# Patient Record
(demographics unavailable — no encounter records)

---

## 2024-10-28 NOTE — ASSESSMENT
[FreeTextEntry1] : Patient is a 63-year-old female with a past medical history as above who presents for Rx refills and general follow-up.

## 2024-10-28 NOTE — PHYSICAL EXAM
[No Acute Distress] : no acute distress [Well Nourished] : well nourished [Well Developed] : well developed [Well-Appearing] : well-appearing [Normal Voice/Communication] : normal voice/communication [Normal Sclera/Conjunctiva] : normal sclera/conjunctiva [PERRL] : pupils equal round and reactive to light [EOMI] : extraocular movements intact [Normal Outer Ear/Nose] : the outer ears and nose were normal in appearance [Normal Oropharynx] : the oropharynx was normal [Normal TMs] : both tympanic membranes were normal [Normal Nasal Mucosa] : the nasal mucosa was normal [No JVD] : no jugular venous distention [No Lymphadenopathy] : no lymphadenopathy [Supple] : supple [Thyroid Normal, No Nodules] : the thyroid was normal and there were no nodules present [No Respiratory Distress] : no respiratory distress  [No Accessory Muscle Use] : no accessory muscle use [Clear to Auscultation] : lungs were clear to auscultation bilaterally [Normal Rate] : normal rate  [Regular Rhythm] : with a regular rhythm [Normal S1, S2] : normal S1 and S2 [No Carotid Bruits] : no carotid bruits [No Abdominal Bruit] : a ~M bruit was not heard ~T in the abdomen [No Varicosities] : no varicosities [Pedal Pulses Present] : the pedal pulses are present [No Edema] : there was no peripheral edema [No Palpable Aorta] : no palpable aorta [Soft] : abdomen soft [Non Tender] : non-tender [Non-distended] : non-distended [No Masses] : no abdominal mass palpated [No HSM] : no HSM [Normal Bowel Sounds] : normal bowel sounds [Normal Supraclavicular Nodes] : no supraclavicular lymphadenopathy [Normal Posterior Cervical Nodes] : no posterior cervical lymphadenopathy [Normal Anterior Cervical Nodes] : no anterior cervical lymphadenopathy [No CVA Tenderness] : no CVA  tenderness [No Spinal Tenderness] : no spinal tenderness [No Joint Swelling] : no joint swelling [Grossly Normal Strength/Tone] : grossly normal strength/tone [No Rash] : no rash [Coordination Grossly Intact] : coordination grossly intact [No Focal Deficits] : no focal deficits [Normal Gait] : normal gait [Deep Tendon Reflexes (DTR)] : deep tendon reflexes were 2+ and symmetric [Speech Grossly Normal] : speech grossly normal [Memory Grossly Normal] : memory grossly normal [Normal Affect] : the affect was normal [Alert and Oriented x3] : oriented to person, place, and time [Normal Mood] : the mood was normal [Normal Insight/Judgement] : insight and judgment were intact [Normal] : affect was normal and insight and judgment were intact [Kyphosis] : no kyphosis [Scoliosis] : no scoliosis [Acne] : no acne

## 2024-10-28 NOTE — PLAN
[FreeTextEntry1] : Psychiatry anxiety - continue Alprazolam 0.5mg TID p.o. p.r.n. as directed, Rx filled,  reviewed; Reference Number:[id number] - continue with CV exercise/weight lifting - continue to follow up with therapist, history of depression - previously recommended continuing exercise; continue to follow up with therapist, Cardiology history of elevated LDL/hyperlipidemia - continue low cholesterol/low fat diet, Endocrinology hyperglycemia - continue low carbohydrate/low sugar diet and exercise hypothyroidism - continue Synthroid (Levothyroxine Sodium) 125mcg p.o.q.d, RX refilled history of low Vitamin D level - temporarily holding vitamin D supplementation for summer history of Vitamin B12 deficiency - holding Vitamin B-12 1000mcg p.o.q.d. in light of high B 12 level on recent blood work check fasting blood work

## 2024-10-28 NOTE — HISTORY OF PRESENT ILLNESS
[FreeTextEntry1] : RX refill and general follow-up  [de-identified] : Patient is a 63-year-old female with a past medical history as below who presents for RX refills and general follow-up. Patient states she is taking all medications as prescribed and denies any adverse reactions or side effects. She denies heat or cold intolerance, fatigue or significant weight change on Synthroid (Levothyroxine Sodium) which she is taking for hypothyroidism.  Patient requests Rx refill for Alprazolam which she takes as needed for anxiety. She states the medication is helping to intermittently control/minimize her symptoms of anxiety and panic. She continues to see her therapist and exercise regularly which has helped her to control her anxiety and panic disorder.  Her most recent blood work from March 2024 demonstrated A1C of 5.4% , , , vitamin B 12 1471.  She is trying to minimize her red meat intake as well as other foods rich in cholesterol.  She has stopped both vitamin D and vitamin B 12 secondary to high serum B 12 level and her increased time spent outdoors in summer..  Her TFTs were normal and she requests RX refill on levothyroxine.

## 2024-12-30 NOTE — PLAN
[FreeTextEntry1] : Psychiatry anxiety - continue Alprazolam 0.5mg TID p.o. p.r.n. as directed, Rx filled,  reviewed - continue with CV exercise/weight lifting - continue to follow up with therapist, history of depression - previously recommended continuing exercise; continue to follow up with therapist, Cardiology history of elevated LDL/hyperlipidemia - continue low cholesterol/low fat diet, Endocrinology hyperglycemia - continue low carbohydrate/low sugar diet and exercise hypothyroidism - continue Synthroid (Levothyroxine Sodium) 125mcg p.o.q.d,  history of low Vitamin D level - c/w supplementation history of Vitamin B12 deficiency - holding Vitamin B-12 1000mcg p.o.q.d. in light of high B 12 level on recent blood work

## 2024-12-30 NOTE — END OF VISIT
[FreeTextEntry3] : I, Devorah Nicholas, acted solely as scribe for Dr. Joaquin Morgan DO on this date 12/30/2024.  All medical record entries made by the Scribe were at my, Dr. Joaquin Morgan DO direction and personally dictated by me on 12/30/2024, I have reviewed the chart and agree that the record accurately reflects my personal performance of the history, physical exam, assessment and plan. I have also personally directed, reviewed and agreed with the chart.   [Time Spent: ___ minutes] : I have spent [unfilled] minutes of time on the encounter which excludes teaching and separately reported services.

## 2024-12-30 NOTE — HISTORY OF PRESENT ILLNESS
[FreeTextEntry1] : RX refill and general follow-up  [de-identified] : EMRE GOLDBERG is a 64-year-old female with a past medical history as below who presents for RX refill and general follow-up. Patient states she is taking all medications as prescribed and denies any adverse reactions or side effects. She denies heat or cold intolerance, fatigue or significant weight change on Synthroid (Levothyroxine Sodium) which she is taking for hypothyroidism.  Patient requests Rx refill for Alprazolam which she takes as needed for anxiety. She states the medication is helping to intermittently control/minimize her symptoms of anxiety and panic. She continues to see her therapist and exercise regularly which has helped her to control her anxiety and panic disorder. She notes having more anxiety around the holidays.  Her most recent blood work from October 2024 demonstrated A1C of 5.4% , , , vitamin B 12 764pg/mL.  She is trying to minimize her red meat intake as well as other foods rich in cholesterol.  She has stopped both vitamin D and vitamin B 12 secondary to high serum B 12 level and her increased time spent outdoors..  Her TFTs were normal.

## 2024-12-30 NOTE — ASSESSMENT
[FreeTextEntry1] : EMRE GOLDBERG is a 64-year-old female who presents for RX refills and general follow-up.

## 2025-02-18 NOTE — HISTORY OF PRESENT ILLNESS
[FreeTextEntry1] : Rx refill  general follow up [de-identified] : EMRE GOLDBERG is a 64-year-old female with a past medical history as below who presents for RX refill and general follow-up. Patient states she is taking all medications as prescribed and denies any adverse reactions or side effects. She denies heat or cold intolerance, fatigue or significant weight change on Synthroid (Levothyroxine Sodium) which she is taking for hypothyroidism.  Patient requests Rx refill for Alprazolam which she takes as needed for anxiety. She states the medication is helping to intermittently control/minimize her symptoms of anxiety and panic. She continues to see her therapist and exercise regularly which has helped her to control her anxiety and panic disorder. She notes having more anxiety around the holidays.  Her most recent blood work from October 2024 demonstrated A1C of 5.4% , , , vitamin B 12 764pg/mL.  She is trying to minimize her red meat intake as well as other foods rich in cholesterol.  She has stopped both vitamin D and vitamin B 12 secondary to high serum B 12 level and her increased time spent outdoors..  Her TFTs were normal.

## 2025-02-18 NOTE — REVIEW OF SYSTEMS
[FreeTextEntry1] : Mr. Perez is a 57yo man being seen for follow up of his stroke and posterior circulation stenosis.  His last CTA H+N was last year and showed stable mild basilar artery stenosis and pca stenosis.  He has had no symptoms since last I spoke with him on May 18 2020.\par Is compliant with Brilinta and aspirin\joesph Says cholesterol is good\par Checks his BP and says is normal\par  [Anxiety] : anxiety [Negative] : Heme/Lymph

## 2025-02-18 NOTE — HISTORY OF PRESENT ILLNESS
[FreeTextEntry1] : Rx refill  general follow up [de-identified] : EMRE GOLDBERG is a 64-year-old female with a past medical history as below who presents for RX refill and general follow-up. Patient states she is taking all medications as prescribed and denies any adverse reactions or side effects. She denies heat or cold intolerance, fatigue or significant weight change on Synthroid (Levothyroxine Sodium) which she is taking for hypothyroidism.  Patient requests Rx refill for Alprazolam which she takes as needed for anxiety. She states the medication is helping to intermittently control/minimize her symptoms of anxiety and panic. She continues to see her therapist and exercise regularly which has helped her to control her anxiety and panic disorder. She notes having more anxiety around the holidays.  Her most recent blood work from October 2024 demonstrated A1C of 5.4% , , , vitamin B 12 764pg/mL.  She is trying to minimize her red meat intake as well as other foods rich in cholesterol.  She has stopped both vitamin D and vitamin B 12 secondary to high serum B 12 level and her increased time spent outdoors..  Her TFTs were normal.

## 2025-02-18 NOTE — END OF VISIT
[FreeTextEntry3] : I, Devorah Nicholas, acted solely as scribe for Dr. Joaquin Morgan DO on this date 02/18/2025.   All medical record entries made by the Scribe were at my, Dr. Joaquin Morgan DO direction and personally dictated by me on 02/18/2025, I have reviewed the chart and agree that the record accurately reflects my personal performance of the history, physical exam, assessment and plan. I have also personally directed, reviewed and agreed with the chart.     [Time Spent: ___ minutes] : I have spent [unfilled] minutes of time on the encounter which excludes teaching and separately reported services.

## 2025-03-20 NOTE — HISTORY OF PRESENT ILLNESS
[FreeTextEntry1] : GYN/Ref: Jakub Yang MD PCP: Dr. Rony Morgan Cards: Dr. Carey GI: Dr. Orville Pearce  Ms. Diaz is a 63 y.o. woman here for a follow up evaluation for a persistently abnormal low-grade pap after TLH BS for ASC-H pap in October of 2022 with benign pathology in the hysterectomy specimen. Post operatively she developed vaginal bleeding after intercourse and she had a small cuff separation that did not need repair. It healed spontaneously.  her last pap in Sept 2024 was ASCUS with hr HPV+ for 18/45 but negative colpo a few months prior. She is here today for a follow up visit and to repeat her pap smear. She reports no complaints. Even her hot flushes are better, likely because of the exercise routine she has. She still has issues with her arthritis in her hands, neck pain and her back but the yoga and pilates seem to help. She also has vaginal dryness. She feels well overall. No bleeding.  Health Maintenance: BMI: 23 COVID vaccine received: denies Mammogram: overdue and she is planning to do US and thermograms as she does not want to do mammograms Colonoscopy: 7/2018 -wnl as per Patient

## 2025-03-20 NOTE — LETTER BODY
[Dear  ___] : Dear  [unfilled], [I recently saw our patient [unfilled] for a follow-up visit.] : I recently saw our patient, [unfilled] for a follow-up visit. [FreeTextEntry2] : AS you may recall she is a very athletic 63 y.o. whom you sent to me back in 2022 for an ASC-H pap. She had a TLH and BS with negative pathology. I saw her in follow up today for a persistent ASCUS +hrHPV vaginal pap. Her colpo was completely normal and I recommended repeating her pap in 6 months. 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 I just wanted you to know I was evaluating her.

## 2025-03-20 NOTE — REVIEW OF SYSTEMS
[Negative] : Musculoskeletal [FreeTextEntry4] : voiding without any issues [FreeTextEntry6] : arthritis in her hands [FreeTextEntry7] : Occ hot flushes [de-identified] : neck pain and back pain better with exercises

## 2025-03-20 NOTE — PHYSICAL EXAM
[Chaperone Present] : A chaperone was present in the examining room during all aspects of the physical examination [Abnormal] : Neck: Abnormal [Absent] : Uterus: Absent [Normal] : Recto-Vaginal Exam: Normal [Fully active, able to carry on all pre-disease performance without restriction] : Status 0 - Fully active, able to carry on all pre-disease performance without restriction [FreeTextEntry2] : Jaida [FreeTextEntry1] : Very athletic body [de-identified] : thyroidectomy scar, well healed. No masses [de-identified] : Laparoscopy ports sites healed nicely with no hernias [de-identified] : Vulvar Atrophy, particularly around the introitus but no lesions or inflammation [de-identified] : The apex is healed without visible lesions. a pap was performed [de-identified] : smooth with intact vaginal cuff

## 2025-03-20 NOTE — PAST MEDICAL HISTORY
[Postmenopausal] : The patient is postmenopausal [Menarche Age ____] : age at menarche was [unfilled] [Menopause Age____] : age at menopause was [unfilled] [Approximately ___] : the LMP was approximately [unfilled] [Total Preg ___] : G[unfilled] [Live Births ___] : P[unfilled]  [Full Term ___] : Full Term: [unfilled] [Premature ___] : Premature: [unfilled] [Living ___] : Living: [unfilled]

## 2025-03-20 NOTE — REASON FOR VISIT
[FreeTextEntry1] : Follow up for abnormal vaginal pap. She is S/P TL BS for Hx ASC-H pap smear with +hr HPV DNA but -16/18 in October of 2022.

## 2025-03-20 NOTE — ASSESSMENT
[FreeTextEntry1] : 64 y.o. otherwise healthy and athletic woman with a long history of abnormal paps, s/p hysterectomy for ASC-H pap in 2022 and now has persistent ASCUS pap with +hrHPV 18/45. She had a normal exam today and I repeated her pap. Her colposcopy a year ago did not reveal any abnormalities in the vagina. We repeated her pap today and plan to see her again in 12 months to repeat it again. If this pap is abnormal, I will bring her in for colpo as it has been a year. We also talked about vaginal dryness and specifically discussed the results of the [facility]. She can consider short regiments of periodic vaginal estrogen if her symptoms really bother her. She just cannot do it regularly as she does have systemic absorption of estrogen from the vagina.  All her questions were answered. She expressed understanding.

## 2025-06-20 NOTE — ASSESSMENT
[FreeTextEntry1] : Patient is a 64 year old F with a past medical history as below who presents to the office today for a general follow up and fasting bloodwork.

## 2025-06-20 NOTE — END OF VISIT
[FreeTextEntry3] : "I, Shirin Stahl, personally scribed the services dictated to me by Dr. Joaquin Morgan in this documentation on 06/20/2025"   "I, Dr. Joaquin Morgan, DO, personally performed the services described in this documentation on 06/20/2025 for the patient as scribed by Shirin Stahl in my presence. I have reviewed and verified that all the information is accurate and true."

## 2025-06-20 NOTE — HISTORY OF PRESENT ILLNESS
[FreeTextEntry1] : general follow up and fasting bloodwork   [de-identified] : EMRE GOLDBERG is a 64-year-old female with a past medical history as below who presents for RX refill and general follow-up. Patient states she is taking all medications as prescribed and denies any adverse reactions or side effects. She denies heat or cold intolerance, fatigue or significant weight change on Synthroid (Levothyroxine Sodium) which she is taking for hypothyroidism. She continues to see her therapist and exercise regularly which has helped her to control her anxiety and panic disorder. She states Alprazolam is helping to intermittently control/minimize her symptoms of anxiety and panic which have recently increased secondary to the loss of her mother. She requests a refill. She saw orthopedics and had a X-ray of the knee which showed osteoarthritis.

## 2025-06-20 NOTE — PLAN
[FreeTextEntry1] : Psychiatry anxiety - continue Alprazolam 0.5mg TID p.o. p.r.n. as directed, Rx filled,  reviewed - continue with CV exercise/weight lifting - continue to follow up with therapist, history of depression - previously recommended continuing exercise; continue to follow up with therapist, Cardiology history of elevated LDL/hyperlipidemia - continue low cholesterol/low fat diet, Endocrinology hyperglycemia - continue low carbohydrate/low sugar diet and exercise hypothyroidism - continue Synthroid (Levothyroxine Sodium) 125mcg p.o.q.d, history of low Vitamin D level - c/w supplementation history of Vitamin B12 deficiency - holding Vitamin B-12 1000mcg p.o.q.d. in light of high B 12 level on recent blood work. Musculoskeletal RX Meloxicam 15 MG sent to pharmacy secondary to osteoarthritis of the knee  check fasting blood work - A1C, anti-thyroid, CBC, CMP, TSH, lipid profile, Free T3-T4, vitamin B12/D